# Patient Record
Sex: MALE | Race: OTHER | NOT HISPANIC OR LATINO | URBAN - METROPOLITAN AREA
[De-identification: names, ages, dates, MRNs, and addresses within clinical notes are randomized per-mention and may not be internally consistent; named-entity substitution may affect disease eponyms.]

---

## 2019-08-21 ENCOUNTER — EMERGENCY (EMERGENCY)
Facility: HOSPITAL | Age: 59
LOS: 1 days | Discharge: HOME | End: 2019-08-21
Attending: EMERGENCY MEDICINE
Payer: MEDICAID

## 2019-08-21 VITALS
TEMPERATURE: 98 F | DIASTOLIC BLOOD PRESSURE: 74 MMHG | RESPIRATION RATE: 16 BRPM | HEART RATE: 78 BPM | OXYGEN SATURATION: 98 % | SYSTOLIC BLOOD PRESSURE: 118 MMHG

## 2019-08-21 VITALS
RESPIRATION RATE: 15 BRPM | TEMPERATURE: 98 F | OXYGEN SATURATION: 95 % | WEIGHT: 210.1 LBS | HEART RATE: 77 BPM | HEIGHT: 71 IN | DIASTOLIC BLOOD PRESSURE: 69 MMHG | SYSTOLIC BLOOD PRESSURE: 125 MMHG

## 2019-08-21 PROCEDURE — 99284 EMERGENCY DEPT VISIT MOD MDM: CPT | Mod: 25

## 2019-08-21 PROCEDURE — 93971 EXTREMITY STUDY: CPT | Mod: 26,LT

## 2019-08-21 PROCEDURE — 99284 EMERGENCY DEPT VISIT MOD MDM: CPT

## 2019-08-21 PROCEDURE — 93971 EXTREMITY STUDY: CPT

## 2019-08-21 RX ORDER — CEPHALEXIN 500 MG
1 CAPSULE ORAL
Qty: 14 | Refills: 0
Start: 2019-08-21

## 2019-08-21 RX ORDER — CEPHALEXIN 500 MG
500 CAPSULE ORAL ONCE
Refills: 0 | Status: DISCONTINUED | OUTPATIENT
Start: 2019-08-21 | End: 2019-08-31

## 2019-08-21 NOTE — ED ADULT NURSE NOTE - OBJECTIVE STATEMENT
Patient alert and oriented x4 c/o LLE swelling and redness xfew days. patient states he had on new boots and it was chafing his leg. leg apppears swollen, redness noted, no drainage noted., denies numbness or tingling to LLE. also denies sob or chest pain. lung sounds clear through out. color normal for ethnicity. admits to etoh today, denies drinking everyday. denies si/hi. able to move all extremities with out difficulty.

## 2019-08-21 NOTE — ED PROVIDER NOTE - PHYSICAL EXAMINATION
Gen: No acute distress, non toxic  HEENT: Mucous membranes moist, pink conjunctivae, EOMI  CV: RRR, nl s1/s2.  Resp: CTAB, normal rate and effort  GI: Abdomen soft, NT, ND. No rebound, no guarding  : No CVAT  Neuro: A&O x 3, moving all 4 extremities  MSK: mild b/l LE swelling, neurovasuclarly intact b/l.   Skin: LLE with band lesion across calf with some excoriated/chaffed skin with some mild surrounding erythema.

## 2019-08-21 NOTE — ED PROVIDER NOTE - OBJECTIVE STATEMENT
58 y/o male no significant pmh c/o pain/chaffing/redness to left mid calf. Pt , was wearing different tight boots for past 10 days, tried adjusting them. Over time starting to have breakdown of skin where top of boots were touching on left. No longer has to wear them. No cp, sob, f/c, tingling or numbness. Pt states drank some etoh, no hx of withdrawal, no other drugs.     ROS: No fever/chills. No eye pain/changes in vision, No ear pain/sore throat/dysphagia, No chest pain/palpitations. No SOB/cough/. No abdominal pain, N/V/D, no black/bloody bm. No dysuria/frequency/discharge, No headache. No Dizziness.     No numbness/tingling/weakness.

## 2019-08-21 NOTE — ED PROVIDER NOTE - CLINICAL SUMMARY MEDICAL DECISION MAKING FREE TEXT BOX
pt with likely contact irritation to skin with some early surround cellulitis. with some swelling likely from compression. will do doppler to ensure no dvt given smoking and skin changes on left. keflex.

## 2019-08-21 NOTE — ED ADULT TRIAGE NOTE - CHIEF COMPLAINT QUOTE
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 Pt states that when they docked this morning he noticed that his left leg is very red and swollen. Has area of broken skin around his calf from the new boots. Pt admits to drinking "a lot" today.